# Patient Record
Sex: FEMALE
[De-identification: names, ages, dates, MRNs, and addresses within clinical notes are randomized per-mention and may not be internally consistent; named-entity substitution may affect disease eponyms.]

---

## 2018-05-30 ENCOUNTER — HOSPITAL ENCOUNTER (EMERGENCY)
Dept: HOSPITAL 31 - C.ER | Age: 38
Discharge: HOME | End: 2018-05-30
Payer: COMMERCIAL

## 2018-05-30 VITALS
OXYGEN SATURATION: 97 % | DIASTOLIC BLOOD PRESSURE: 78 MMHG | HEART RATE: 76 BPM | RESPIRATION RATE: 18 BRPM | TEMPERATURE: 98.8 F | SYSTOLIC BLOOD PRESSURE: 122 MMHG

## 2018-05-30 DIAGNOSIS — M54.5: Primary | ICD-10-CM

## 2018-05-30 LAB
BILIRUB UR-MCNC: NEGATIVE MG/DL
GLUCOSE UR STRIP-MCNC: NORMAL MG/DL
LEUKOCYTE ESTERASE UR-ACNC: (no result) LEU/UL
PH UR STRIP: 6 [PH] (ref 5–8)
PROT UR STRIP-MCNC: NEGATIVE MG/DL
RBC # UR STRIP: NEGATIVE /UL
SP GR UR STRIP: 1.02 (ref 1–1.03)
SQUAMOUS EPITHIAL: < 1 /HPF (ref 0–5)
UROBILINOGEN UR-MCNC: NORMAL MG/DL (ref 0.2–1)

## 2018-05-30 PROCEDURE — 81001 URINALYSIS AUTO W/SCOPE: CPT

## 2018-05-30 PROCEDURE — 99284 EMERGENCY DEPT VISIT MOD MDM: CPT

## 2018-05-30 PROCEDURE — 96372 THER/PROPH/DIAG INJ SC/IM: CPT

## 2018-05-30 NOTE — C.PDOC
History Of Present Illness





Patient is a 38 y/o female who presents to the ED with a complaint of new onset 

of right back pain for the last 7 days. Patient admits pain is localized and 

worsens when standing up or in supine position; describes pain as sharp, 

admitting to spasms. Denies any trauma, history of chronic back pain, relief 

with Aleve taken yesterday, or any other associated symptoms. 





NEW ONSET R BACK PAIN X 7 DAYS. LOCALIZED WORSE WHEN STANDING UP OR SUPINE. 

SHARP, SPASM. NO RELIEF W ALEVE YESTERDAY. NO TRAUMA, DENIES HO CHRONIC BACK 

PAIN, OTHER ASSOC SX





EXAM


MILD DIST NONTOXIC


BACK LIMITED FULL EXTENSION, ROM DUE TO PAIN. +SPASM R LOWER BACK. NO LS TEND


ABD NEG


NEURO INTACT


REMAINDER NEG


Time Seen by Provider: 05/30/18 08:59


Chief Complaint (Nursing): Back Pain


History Per: Patient


History/Exam Limitations: no limitations


Onset/Duration Of Symptoms: Days (7 days)


Current Symptoms Are (Timing): Still Present


Previous Symptoms: denies: Chronic Pain


Exacerbating Factor(s): Standing, Other (supine)


Recent travel outside of the [place]: No





Past Medical History


Reviewed: Historical Data, Nursing Documentation, Vital Signs


Vital Signs: 


 Last Vital Signs











Temp  98.4 F   05/30/18 08:49


 


Pulse  84   05/30/18 08:49


 


Resp  20   05/30/18 08:49


 


BP  135/78   05/30/18 08:49


 


Pulse Ox  99   05/30/18 09:52














- Medical History


PMH: No Chronic Diseases


Surgical History: No Surg Hx


Family History: States: No Known Family Hx





- Social History


Hx Tobacco Use: No


Hx Alcohol Use: Yes


Hx Substance Use: No





- Immunization History


Hx Tetanus Toxoid Vaccination: No


Hx Influenza Vaccination: No


Hx Pneumococcal Vaccination: No





Review Of Systems


Musculoskeletal: Positive for: Back Pain (right sided).  Negative for: Leg Pain


Neurological: Negative for: Weakness, Numbness, Dizziness





Physical Exam





- Physical Exam


Appears: Non-toxic, In Acute Distress (mild)


Skin: Normal Color, Warm, Dry


Head: Atraumatic, Normacephalic


Eye(s): bilateral: Normal Inspection


Oral Mucosa: Moist


Chest: Symmetrical


Cardiovascular: Rhythm Regular, No Murmur


Respiratory: Normal Breath Sounds, No Rales, No Rhonchi, No Wheezing


Gastrointestinal/Abdominal: Soft, No Tenderness


Back: Decreased ROM (limited full extension secondary to pain), Muscle Spasm (

right lower back), Other (negative lumbar tenderness)


Extremity: No Tenderness


Neurological/Psych: Oriented x3, Normal Speech, Normal Cognition, Normal Motor, 

Normal Sensation, Other (no focal deficits)





ED Course And Treatment


O2 Sat by Pulse Oximetry: 99


Progress Note: Tylenol, flexeril, decadron, neurontin, toradol, and lidoderm 

administered. POC urine ordered.





Disposition


Counseled Patient/Family Regarding: Studies Performed, Diagnosis, Need For 

Followup, Rx Given





- Disposition


Referrals: 


YOUR,PMD [Other]


Disposition: HOME/ ROUTINE


Disposition Time: 10:30


Condition: IMPROVED


Prescriptions: 


Cyclobenzaprine [Flexeril] 10 mg PO TID #15 tab


Gabapentin [Neurontin] 300 mg PO TID #30 cap


Ibuprofen [Motrin] 600 mg PO Q6 #30 tab


Lidocaine 5% [Lidoderm] 1 ea TD PRN PRN #10 patch


 PRN Reason: Pain, Moderate (4-7)


Instructions:  Low Back Pain  (DC)


Forms:  General Discharge Instructions, [facility] (English), Work Excuse





- Clinical Impression


Clinical Impression: 


 Low back pain








- Scribe Statement


The provider has reviewed the documentation as recorded by the Scribe





Zeenat Manriquez





All medical record entries made by the Scribe were at my direction and 

personally dictated by me. I have reviewed the chart and agree that the record 

accurately reflects my personal performance of the history, physical exam, 

medical decision making, and the department course for this patient. I have 

also personally directed, reviewed, and agree with the discharge instructions 

and disposition.